# Patient Record
(demographics unavailable — no encounter records)

---

## 2024-10-07 NOTE — PLAN
[FreeTextEntry1] : 73-year-old female presents for evaluation Glucose intolerance-history of elevated blood sugar readings and A1c readings has not been on medication for diabetes.  [id number] at home Tremors-eager to try vitamin B12 shot Lab work is drawn we will plan to start medication Ileostomy-she has a history of Crohn's disease with ileostomy and ileostomy care is provided

## 2024-10-07 NOTE — HISTORY OF PRESENT ILLNESS
[FreeTextEntry1] : Anastasiya [de-identified] : Elevated blood pressure and sugar readings at home

## 2024-12-14 NOTE — HEALTH RISK ASSESSMENT
[0] : 2) Feeling down, depressed, or hopeless: Not at all (0) [PHQ-2 Negative - No further assessment needed] : PHQ-2 Negative - No further assessment needed [[id number]] : 0

## 2024-12-14 NOTE — HEALTH RISK ASSESSMENT
[0] : 2) Feeling down, depressed, or hopeless: Not at all (0) [PHQ-2 Negative - No further assessment needed] : PHQ-2 Negative - No further assessment needed [[id number]] : 0

## 2024-12-14 NOTE — PLAN
[FreeTextEntry1] : 73-year-old female accompanied by her , presents for evaluation Hypothyroidism-continuation of L-thyroxine 50 mcg daily Lab work is done for TFT and metabolic evaluation Vitamin D deficiency-vitamin B12 injections are to be given they were helpful for her she will receive the monthly for a while and see how she does Hyperlipidemia-fenofibrate 48 mg tablets as renewed

## 2025-01-22 NOTE — HEALTH RISK ASSESSMENT
[0] : 2) Feeling down, depressed, or hopeless: Not at all (0) [PHQ-2 Negative - No further assessment needed] : PHQ-2 Negative - No further assessment needed [[id number]] : 0

## 2025-01-22 NOTE — HISTORY OF PRESENT ILLNESS
[FreeTextEntry1] : B12 shot [de-identified] : Anxiety Crohn's disease Glucose intolerance Hypertension/hyperlipidemia Hypothyroidism Hyperuricemia Nephrocalcinosis

## 2025-03-27 NOTE — ASSESSMENT
[FreeTextEntry1] : 73F here for B12 injection. Vitals stable. PE grossly normal, no acute distress. Hx. CKD, Crohn's with ileostomy, HLD, HTN, DM, hypothyroidism. Chronically at risk for nutritional deficiency.   B12 Injection- tolerated 1000 mcg given left deltoid IM.

## 2025-06-08 NOTE — HEALTH RISK ASSESSMENT
[0] : 2) Feeling down, depressed, or hopeless: Not at all (0) [PHQ-2 Negative - No further assessment needed] : PHQ-2 Negative - No further assessment needed [[id number]] : 0

## 2025-06-08 NOTE — HEALTH RISK ASSESSMENT
[0] : 2) Feeling down, depressed, or hopeless: Not at all (0) [PHQ-2 Negative - No further assessment needed] : PHQ-2 Negative - No further assessment needed [[id number]] : 0

## 2025-06-08 NOTE — PLAN
Standing/Walking/Toileting [FreeTextEntry1] : This 73-year-old female presents for evaluation Crohn disease-followed by GI she has an ileostomy in place. Controlled with medication She is about to take a trip to [place] and is concerned about passing through the security screen with ileostomy in place and note is written stating same Hypothyroidism-L-thyroxine 50 mcg daily administered on an empty stomach Hyperlipidemia-fenofibrate 48 mg daily Vitamin B deficiency-an injection of vitamin B12 is administered

## 2025-06-08 NOTE — PLAN
[FreeTextEntry1] : This 73-year-old female presents for evaluation Crohn disease-followed by GI she has an ileostomy in place. Controlled with medication She is about to take a trip to [place] and is concerned about passing through the security screen with ileostomy in place and note is written stating same Hypothyroidism-L-thyroxine 50 mcg daily administered on an empty stomach Hyperlipidemia-fenofibrate 48 mg daily Vitamin B deficiency-an injection of vitamin B12 is administered

## 2025-06-08 NOTE — HISTORY OF PRESENT ILLNESS
[FreeTextEntry1] : B12 shot [de-identified] : Anxiety Crohn's disease Glucose intolerance Hypertension/hyperlipidemia Hypothyroidism Hyperuricemia Nephrocalcinosis

## 2025-06-08 NOTE — HISTORY OF PRESENT ILLNESS
[FreeTextEntry1] : B12 shot [de-identified] : Anxiety Crohn's disease Glucose intolerance Hypertension/hyperlipidemia Hypothyroidism Hyperuricemia Nephrocalcinosis

## 2025-06-27 NOTE — HEALTH RISK ASSESSMENT
[0] : 2) Feeling down, depressed, or hopeless: Not at all (0) [PHQ-2 Negative - No further assessment needed] : PHQ-2 Negative - No further assessment needed [[id number]] : 0

## 2025-06-27 NOTE — PLAN
[FreeTextEntry1] : This 73-year-old female presents f for evaluation of anemia Vitamin B12 deficiency-she recently and regularly receives supplementation with B12 injections 1000 mcg IM.  That medication is administered into the left deltoid without incident Crohn disease-followed by GI she has an ileostomy in place. Controlled with medication She is about to take a trip to [place] and is concerned about passing through the security screen with ileostomy in place and note is written stating same Hypothyroidism-L-thyroxine 50 mcg daily administered on an empty stomach Hyperlipidemia-fenofibrate 48 mg daily

## 2025-06-27 NOTE — HISTORY OF PRESENT ILLNESS
[FreeTextEntry1] : B12 shot [de-identified] : Anxiety Crohn's disease Glucose intolerance Hypertension/hyperlipidemia Hypothyroidism Hyperuricemia Nephrocalcinosis